# Patient Record
(demographics unavailable — no encounter records)

---

## 2017-07-05 NOTE — RAD
Indication cramping.



A first trimester obstetrical ultrasound examination was performed. No prior

imaging is available associated with this pregnancy.



The uterus measures approximately 9.7 x 5.9 x 5.8 cm. There is an intrauterine

gestational sac. There is a fetal pole. The crown-rump length of 2.6 cm is

compatible with a gestational age of approximately 9 weeks 3 days. By

sonographic analysis the expected date of confinement is 2/4/2018. A fetal

heart rate of 168 was documented. The maternal ovaries appeared unremarkable.



IMPRESSION: Single, viable, IUP of approximately 9 weeks 3 days gestation.

## 2017-07-05 NOTE — PHYS DOC
Past Medical History


Past Medical History:  No Pertinent History


Past Surgical History:  No Surgical History


Alcohol Use:  None


Drug Use:  Marijuana





Adult General


Chief Complaint


Chief Complaint:  ABDOMINAL PAIN





HPI


HPI





26-year-old female presenting to the emergency department today with a cramping 

sensation on both sides for flank. This been present for about 1-2 weeks. She 

reports not having a menstrual period and is concerned she may be pregnant. The 

pain is a mild intermittent discomfort. It is nonradiating. She denies polyuria 

or dysuria. She denies hematuria.





Review of systems is negative for chest pain shortness of breath fevers chills 

vomiting. She denies any changes in her vaginal fluid. All other review of 

systems is negative unless otherwise noted in history of present illness.





ED course: 26-year-old  female presenting to the emergency department To 

Be Pregnant on Urine Pregnancy Test. Ultrasound Obtained Which Showed a Single 

Intrauterine pregnancy. Labs showed mildly low potassium. Unfortunately, the 

patient had to leave prior to being able to be discharged. I informed her that 

she had low potassium and that if she would be able to wait I would write her 

prescription for potassium and a prenatal vitamin. She stated she needed to 

leave and left. She was able to make medical decisions.





Review of Systems


Review of Systems


SEE ABOVE.





Allergies


Allergies





Allergies








Coded Allergies Type Severity Reaction Last Updated Verified


 


  morphine Allergy Intermediate  14 Yes











Physical Exam


Physical Exam


SEE ABOVE


Constitutional: Well developed, well nourished, no acute distress, non-toxic 

appearance. []


HENT: Normocephalic, atraumatic, bilateral external ears normal, oropharynx 

moist, no oral exudates, nose normal. []


Eyes: PERRLA, EOMI, conjunctiva normal, no discharge. [] 


Neck: Normal range of motion, no tenderness, supple, no stridor. [] 


Cardiovascular:Heart rate regular rhythm, no murmur []


Lungs & Thorax:  Bilateral breath sounds clear to auscultation []


Abdomen: Abdomen is soft and nontender palpation. Gravid abdomen. Nontender 

McBurney's point. Negative Graay sign. 


Skin: Warm, dry, no erythema, no rash. [] 


Back: No tenderness, no CVA tenderness. [] 


Extremities: No tenderness, no cyanosis, no clubbing, ROM intact, no edema. [] 


Neurologic: Alert and oriented X 3, normal motor function, normal sensory 

function, no focal deficits noted. []


Psychologic: Affect normal, judgement normal, mood normal. []





Current Patient Data


Vital Signs





 Vital Signs








  Date Time  Temp Pulse Resp B/P (MAP) Pulse Ox O2 Delivery O2 Flow Rate FiO2


 


17 07:36  100 20 136/77 (96) 100   


 


17 06:40 98.5       





 98.5       








Lab Values





 Laboratory Tests








Test


  17


05:53 17


07:00 17


07:40


 


POC Urine HCG, Qualitative


  Hcg positive


(Negative) 


  


 


 


Urine Collection Type  Unknown   


 


Urine Color  Yellow   


 


Urine Clarity  Clear   


 


Urine pH  7.5   


 


Urine Specific Gravity  1.020   


 


Urine Protein


  


  Negative mg/dL


(NEG-TRACE) 


 


 


Urine Glucose (UA)


  


  Negative mg/dL


(NEG) 


 


 


Urine Ketones (Stick)


  


  Negative mg/dL


(NEG) 


 


 


Urine Blood


  


  Negative (NEG)


  


 


 


Urine Nitrite


  


  Negative (NEG)


  


 


 


Urine Bilirubin


  


  Negative (NEG)


  


 


 


Urine Urobilinogen Dipstick


  


  0.2 mg/dL (0.2


mg/dL) 


 


 


Urine Leukocyte Esterase


  


  Negative (NEG)


  


 


 


Urine RBC  0 /HPF (0-2)   


 


Urine WBC


  


  5-10 /HPF


(0-4) 


 


 


Urine Squamous Epithelial


Cells 


  Few /LPF  


  


 


 


Urine Bacteria


  


  0 /HPF (0-FEW)


  


 


 


Urine Mucus  Slight /LPF   


 


White Blood Count


  


  


  11.5 x10^3/uL


(4.0-11.0)  H


 


Red Blood Count


  


  


  4.22 x10^6/uL


(3.50-5.40)


 


Hemoglobin


  


  


  12.6 g/dL


(12.0-15.5)


 


Hematocrit


  


  


  38.8 %


(36.0-47.0)


 


Mean Corpuscular Volume


  


  


  92 fL ()


 


 


Mean Corpuscular Hemoglobin   30 pg (25-35)  


 


Mean Corpuscular Hemoglobin


Concent 


  


  32 g/dL


(31-37)


 


Red Cell Distribution Width


  


  


  12.8 %


(11.5-14.5)


 


Platelet Count


  


  


  319 x10^3/uL


(140-400)


 


Neutrophils (%) (Auto)   71 % (31-73)  


 


Lymphocytes (%) (Auto)   20 % (24-48)  L


 


Monocytes (%) (Auto)   8 % (0-9)  


 


Eosinophils (%) (Auto)   0 % (0-3)  


 


Basophils (%) (Auto)   1 % (0-3)  


 


Neutrophils # (Auto)


  


  


  8.2 x10^3uL


(1.8-7.7)  H


 


Lymphocytes # (Auto)


  


  


  2.3 x10^3/uL


(1.0-4.8)


 


Monocytes # (Auto)


  


  


  0.9 x10^3/uL


(0.0-1.1)


 


Eosinophils # (Auto)


  


  


  0.0 x10^3/uL


(0.0-0.7)


 


Basophils # (Auto)


  


  


  0.1 x10^3/uL


(0.0-0.2)


 


Maternal Serum HCG Beta


Subunit 


  


  000018 mIU/mL


(0-5)  H


 


Sodium Level


  


  


  139 mmol/L


(136-145)


 


Potassium Level


  


  


  3.3 mmol/L


(3.5-5.1)  L


 


Chloride Level


  


  


  104 mmol/L


()


 


Carbon Dioxide Level


  


  


  25 mmol/L


(21-32)


 


Anion Gap   10 (6-14)  


 


Blood Urea Nitrogen


  


  


  11 mg/dL


(7-20)


 


Creatinine


  


  


  0.6 mg/dL


(0.6-1.0)


 


Estimated GFR


(Cockcroft-Gault) 


  


  146.2  


 


 


Glucose Level


  


  


  94 mg/dL


(70-99)


 


Calcium Level


  


  


  8.5 mg/dL


(8.5-10.1)


 


Total Bilirubin


  


  


  0.3 mg/dL


(0.2-1.0)


 


Direct Bilirubin


  


  


  0.1 mg/dL


(0.0-0.2)


 


Aspartate Amino Transferase


(AST) 


  


  14 U/L (15-37)


L


 


Alanine Aminotransferase (ALT)


  


  


  18 U/L (14-59)


 


 


Alkaline Phosphatase


  


  


  47 U/L


()


 


Total Protein


  


  


  7.4 g/dL


(6.4-8.2)


 


Albumin


  


  


  3.8 g/dL


(3.4-5.0)


 


Lipase


  


  


  83 U/L


()





 Laboratory Tests


17 07:40








 Laboratory Tests


17 07:40














EKG


EKG


[]





Radiology/Procedures


Radiology/Procedures


[]





Course & Med Decision Making


Course & Med Decision Making


Pertinent Labs and Imaging studies reviewed. (See chart for details)





[]





Dragon Disclaimer


Dragon Disclaimer


This electronic medical record was generated, in whole or in part, using a 

voice recognition dictation system.





Departure


Departure


Impression:  


 Primary Impression:  


 Abdominal pain affecting pregnancy


Disposition:   HOME, SELF-CARE


Condition:  STABLE


Referrals:  


CASPER RIDDLE MD (PCP)








NERI JOINER Jr, MD


3 days with baby doctor


Patient Instructions:  ABCs of Pregnancy





Additional Instructions:  


Thank you for allowing us to participate in your care today.





Followup with baby doctor in 3 days.  Call your Primary Doctor tomorrow and 

inform them of your visit today.  If you do not have a primary care provider 

you can ask for a list of our primary care providers. Return to the emergency 

department you have any new or concerning findings.





This should be evaluated by the primary care physician and any necessary 

consulting services for continued management within a few days after discharge. 

Return to emergency room if you have any  new or concerning symptoms including 

but not limited to fever, chills, nausea, vomiting, intractable pain, any new 

rashes, chest pain, shortness of air, uncontrolled bleeding, difficulty 

breathing, and/or vision loss.


Scripts


Prenatal Vits W-Ca,Fe,Fa(<1MG) (PRENATAL VITAMINS) 1 Each Tablet


1 TAB PO DAILY, #15 TAB 0 Refills


   Prov: JOVITA MELGOZA MD         17











JOVITA MELGOZA MD 2017 07:50

## 2017-08-03 NOTE — PHYS DOC
Past Medical History


Past Medical History:  No Pertinent History


Past Surgical History:  Tonsillectomy


Alcohol Use:  None


Drug Use:  Marijuana





Adult General


Chief Complaint


Chief Complaint:  SKIN PROBLEM





HPI


HPI





Patient is a 26  year old female currently 14 weeks pregnant who presents with 

a pruritic rash that began this morning. Patient denies any known cause for 

this rash. She states she follows up with her OB. She has an appointment on 

August 16, 2017.





Review of Systems


Review of Systems





Constitutional: Denies fever or chills []


GI:pregnant


: Denies dysuria or hematuria []


Musculoskeletal: Denies back pain or joint pain []


Integument: rash 


Neurologic: Denies headache, focal weakness or sensory changes []





Allergies


Allergies





Allergies








Coded Allergies Type Severity Reaction Last Updated Verified


 


  morphine Allergy Intermediate  5/27/14 Yes











Physical Exam


Physical Exam





Constitutional: Well developed, well nourished, no acute distress, non-toxic 

appearance. []


Abdomen: Gravid abdomen. Bowel sounds normal, soft, no tenderness, no masses, 

no pulsatile masses. [] 


Skin: Patient has mild amount of erythema is papular rash on bilateral lower 

extremities and small amount of the same rash on bilateral upper extremities.


Back: No tenderness, no CVA tenderness. [] 


Extremities: No tenderness, no cyanosis, no clubbing, ROM intact, no edema. [] 


Neurologic: Alert and oriented X 3, normal motor function, normal sensory 

function, no focal deficits noted. []


Psychologic: Affect normal, judgement normal, mood normal. []





Current Patient Data


Vital Signs





 Vital Signs








  Date Time  Temp Pulse Resp B/P (MAP) Pulse Ox O2 Delivery O2 Flow Rate FiO2


 


8/3/17 11:53 98.4 104 20  97 Room Air  





 98.4       











EKG


EKG


[]





Radiology/Procedures


Radiology/Procedures


[]





Course & Med Decision Making


Course & Med Decision Making


Pertinent Labs and Imaging studies reviewed. (See chart for details)





Patient has contact dermatitis rash from unknown source. She is currently 14 

weeks pregnant. She'll be discharged with triamcinolone cream. Follow-up with 

her OB/GYN on August 16, 2017 as scheduled.





Dragon Disclaimer


Dragon Disclaimer


This electronic medical record was generated, in whole or in part, using a 

voice recognition dictation system.





Departure


Departure


Impression:  


 Primary Impression:  


 Contact dermatitis


Disposition:  01 HOME, SELF-CARE


Condition:  STABLE


Referrals:  


RENETTA CARLOS (PCP)


Follow-up with your OB/GYN on August 16, 2017


Patient Instructions:  Contact Dermatitis, Easy-to-Read





Additional Instructions:  


You were seen for contact dermatitis rash. Use the cream provided as ordered. 

Follow-up with your OB/GYN on August 16, 2017 or sooner if need be.


Scripts


Triamcinolone Acetonide (TRIAMCINOLONE ACETONIDE 0.1% OINT) 15 Gm Oint...g.


1 ANNETTE TP TID for WOUND CARE, #1 TUBE 1 Refill


   Prov: MIKE DEVI         8/3/17





Problem Qualifiers








 Primary Impression:  


 Contact dermatitis


 Contact dermatitis type:  unspecified  Contact dermatitis trigger:  

unspecified trigger  Qualified Codes:  L25.9 - Unspecified contact dermatitis, 

unspecified cause








MIKE DEVI APRN Aug 3, 2017 12:11

## 2019-11-14 NOTE — PHYS DOC
Past Medical History


Past Medical History:  No Pertinent History


Past Surgical History:  Tonsillectomy


Alcohol Use:  None


Drug Use:  Marijuana





Adult General


Chief Complaint


Chief Complaint:  COUGH





HPI


HPI





Patient is a 28  year old female who presents to the emergency department with 

complaints of a productive cough with green sputum for the last 3 days. Patient 

also reports some nasal congestion and chest congestion. She denies any ear 

pain, sore throat, chest pain, palpitations, abdominal pain, nausea, vomiting, 

diarrhea, dysuria, hematuria, increased urinary frequency. Patient states that 

she recently was diagnosed with influenza about 3 weeks ago. She states that 

those symptoms had resolved completely and she was feeling better up until 3 

days ago. Patient reports that she has been taking TheraFlu for her symptoms 

little relief. She currently complains of fatigue and body aches addition to her

cough. The patient rates her discomfort a 5 out of 10 on pain scale, there are 

no alleviating or exacerbating factors. All other ROS is neg unless otherwise 

noted in HPI.





Review of Systems


Review of Systems


See Above





Allergies


Allergies





Allergies








Coded Allergies Type Severity Reaction Last Updated Verified


 


  morphine Allergy Intermediate  5/27/14 Yes











Physical Exam


Physical Exam


See Above


Constitutional: Well developed, well nourished, no acute distress, non-toxic 

appearance. []


HENT: Normocephalic, atraumatic, bilateral external ears normal, bilateral TMs 

normal, oropharynx moist, no oral exudates, nose congested


Eyes: PERRLA, EOMI, conjunctiva normal, no discharge. [] 


Neck: Normal range of motion, no tenderness, supple, no stridor. [] 


Cardiovascular:Heart rate regular rhythm, no murmur []


Lungs & Thorax:  Bilateral breath sounds clear to auscultation []


Skin: Warm, dry, no erythema, no rash. [] 


Back: No tenderness


Extremities: No cyanosis, no clubbing, ROM intact, no edema. [] 


Neurologic: Alert and oriented X 3, no focal deficits noted. []


Psychologic: Affect normal, judgement normal, mood normal. []





EKG


EKG


[]





Radiology/Procedures


Radiology/Procedures


[]





Course & Med Decision Making


Course & Med Decision Making


Pertinent Labs and Imaging studies reviewed. (See chart for details)





[]





Dragon Disclaimer


Dragon Disclaimer


This electronic medical record was generated, in whole or in part, using a voice

 recognition dictation system.





Departure


Departure


Impression:  


   Primary Impression:  


   Upper respiratory infection, acute


Disposition:  01 HOME, SELF-CARE


Condition:  STABLE


Referrals:  


NO PCP (PCP)


Patient Instructions:  Upper Respiratory Infection, Adult, Easy-to-Read





Additional Instructions:  


Fill prescription(s) and use as directed. Recommend use of a Cool mist 

humidifier in room at bedtime. Alternate Tylenol or ibuprofen as needed for 

pain/fever. Increase clear fluids. Avoid airway triggers such as smoke, 

fragrance, dust, and pollen. May take over-the-counter cough suppressants as 

needed. Follow-up with your primary care doctor if symptoms persist, return to 

the ER if symptoms worsen.


Scripts


Benzonatate (TESSALON PERLE) 100 Mg Capsule


1 CAP PO TID PRN for COUGH for 7 Days, #21 CAP 0 Refills


   Prov: SERGIO MELENDEZ APRN         11/14/19 


Albuterol Sulfate (VENTOLIN HFA INHALER) 18 Gm Hfa.aer.ad


2 PUFF INH Q4HRS PRN for WHEEZING for 30 Days, #1 INHALER 0 Refills


   Prov: SERGIO MELENDEZ APRN         11/14/19











SERGIO MELENDEZ APRN       Nov 14, 2019 16:40

## 2020-10-13 NOTE — RAD
Transvaginal OB ultrasound less than 14 weeks 10/13/2020

 

CLINICAL HISTORY: First trimester pregnancy. Right pelvic pain.

 

TECHNIQUE: A transvaginal pelvic ultrasound study was performed. Multiple 

images were obtained.

 

FINDINGS: The uterus is within normal limits in size and echogenicity. It 

measures 8.5 x 5.2 x 4.5 cm in longitudinal, transverse, and AP 

dimensions. The endometrial echo complex measures 1.9 mm in thickness 

which is within normal limits. No gestational sac is seen within the 

endometrial canal. No focal abnormality of the uterus is seen.

 

Both ovaries are within normal limits in size. The right ovary measures 

3.5 x 2.4 x 2.3 cm in size. The left ovary measures 4.4 x 2.4 x 2.2 cm in 

size. A rounded anechoic structure with internal echoes is seen within the

left ovary which measures 2 cm in size. This could represent a hemorrhagic

follicle/corpus luteum. No adnexal mass is seen. No free fluid is noted.

 

IMPRESSION: Essentially negative study. No IUP is seen. These ultrasound 

findings could be seen with an very early IUP, missed spontaneous 

or possibly due to an occult ectopic pregnancy. Clinical correlation and 

correlation with the patient's serial beta hCG level is recommended.

 

Electronically signed by: Herman Melgoza MD (10/13/2020 5:09 PM) [id number]

## 2020-10-13 NOTE — PHYS DOC
Past Medical History


Past Medical History:  No Pertinent History, Asthma, Other


Additional Past Medical Histor:  ectopic


 (SERGIO MELENDEZ)


Past Surgical History:  Tonsillectomy


Additional Past Surgical Histo:  ectopic pregnancy


 (SERGIO MELENDEZ)


Smoking Status:  Current Every Day Smoker


Alcohol Use:  None


Drug Use:  Marijuana


 (SERGIO MELENDEZ)





General Adult


EDM:


Chief Complaint:  NAUSEA/VOMITING/DIARRHA





HPI:


HPI:





Patient is a 29  year old female who presents to the emergency department with 

concerns of nausea, vomiting, and diarrhea.  Patient reports 2 episodes of 

vomiting and one episode of diarrhea in the last 24 hours.  She states her last 

menstrual cycle was on  reports concerns that she might be 

pregnant.  Patient denies any irregular vaginal discharge, vaginal odor,, or 

vaginal bleeding.  She denies any fever, cough, shortness of breath, dysuria, 

increased urinary frequency, hematuria, back pain, body aches, shortness of 

breath, fatigue, headache, fever, or cough.  Patient reports that if she is 

pregnant this would be her fourth pregnancy, she has 2 living children at home 

and 1 previous right-sided ectopic pregnancy.  She currently rates her abdominal

pain a 4 out of 10 on the pain scale, she reports a dull aching sensation in her

suprapubic and right lower quadrants.  She denies any radiation of the pain to 

her back or alleviating factors.


 (SERGIO MELENDEZ)





Review of Systems:


Review of Systems:


Constitutional:   Denies fever or chills. []


Eyes:   Denies change in visual acuity. []


HENT:   Denies nasal congestion or sore throat. [] 


Respiratory:   Denies cough or shortness of breath. [] 


Cardiovascular:   Denies chest pain or edema. [] 


GI:   See HPI


: See HPI


Musculoskeletal:   Denies back pain or joint pain. [] 


Integument:   Denies rash. [] 


Neurologic:   Denies headache, focal weakness or sensory changes. [] 


Psychiatric:  Denies depression or anxiety. []





Complete ROS is negative unless otherwise stated in the HPI.


 (SERGIO MELENDEZ)





Heart Score:


Risk Factors:


Risk Factors:  DM, Current or recent (<one month) smoker, HTN, HLP, family 

history of CAD, obesity.


Risk Scores:


Score 0 - 3:  2.5% MACE over next 6 weeks - Discharge Home


Score 4 - 6:  20.3% MACE over next 6 weeks - Admit for Clinical Observation


Score 7 - 10:  72.7% MACE over next 6 weeks - Early Invasive Strategies


 (SERGIO MELENDEZ)





Current Medications:





Current Medications








 Medications


  (Trade)  Dose


 Ordered  Sig/[place]yon  Start Time


 Stop Time Status Last Admin


Dose Admin


 


 Ondansetron HCl


  (Zofran)  4 mg  1X  ONCE  10/13/20 14:45


 10/13/20 14:46 DC 10/13/20 14:58


4 MG


 


 Sodium Chloride  1,000 ml @ 


 1,000 mls/hr  1X  ONCE  10/13/20 14:45


 10/13/20 15:44 DC 10/13/20 14:57


1,000 MLS/HR








 (SERGIO MELENDEZ)





Allergies:


Allergies:





Allergies








Coded Allergies Type Severity Reaction Last Updated Verified


 


  morphine Allergy Intermediate  14 Yes








 (SERGIO MELENDEZ)





Physical Exam:


PE:





Constitutional: Well developed, well nourished, no acute distress, non-toxic 

appearance. []


HENT: Normocephalic, atraumatic, bilateral external ears normal, nose normal. []


Eyes: PERRLA, EOMI, conjunctiva normal, no discharge. [] 


Neck: Normal range of motion, no stridor. [] 


Cardiovascular:Heart rate regular rhythm


Lungs & Thorax:  Respirations even and unlabored, no retractions, no respiratory

 distress


Abdomen: soft, right lower quadrant tenderness to palpation, no rebound 

tenderness, no guarding, negative Rovsing's, negative obturator, negative 

McBurney's point; suprapubic tenderness to palpation


Back: No CVA tenderness


Skin: Warm, dry, no erythema, no rash. [] 


Extremities: No cyanosis, ROM intact, no edema. [] 


Neurologic: Alert and oriented X 3, no focal deficits noted. []


Psychologic: Affect normal, judgement normal, mood normal. []


 (SERGIO MELENDEZ)





Current Patient Data:


Labs:





                                Laboratory Tests








Test


 10/13/20


14:46 10/13/20


15:24 10/13/20


15:32


 


White Blood Count


 8.7 x10^3/uL


(4.0-11.0) 


 





 


Red Blood Count


 4.16 x10^6/uL


(3.50-5.40) 


 





 


Hemoglobin


 12.6 g/dL


(12.0-15.5) 


 





 


Hematocrit


 37.8 %


(36.0-47.0) 


 





 


Mean Corpuscular Volume


 91 fL ()


 


 





 


Mean Corpuscular Hemoglobin 30 pg (25-35)    


 


Mean Corpuscular Hemoglobin


Concent 33 g/dL


(31-37) 


 





 


Red Cell Distribution Width


 13.6 %


(11.5-14.5) 


 





 


Platelet Count


 363 x10^3/uL


(140-400) 


 





 


Neutrophils (%) (Auto) 65 % (31-73)    


 


Lymphocytes (%) (Auto) 24 % (24-48)    


 


Monocytes (%) (Auto) 9 % (0-9)    


 


Eosinophils (%) (Auto) 1 % (0-3)    


 


Basophils (%) (Auto) 1 % (0-3)    


 


Neutrophils # (Auto)


 5.7 x10^3/uL


(1.8-7.7) 


 





 


Lymphocytes # (Auto)


 2.1 x10^3/uL


(1.0-4.8) 


 





 


Monocytes # (Auto)


 0.8 x10^3/uL


(0.0-1.1) 


 





 


Eosinophils # (Auto)


 0.1 x10^3/uL


(0.0-0.7) 


 





 


Basophils # (Auto)


 0.1 x10^3/uL


(0.0-0.2) 


 





 


Maternal Serum HCG Beta


Subunit 30 mIU/mL


(0-5)  H 


 





 


Sodium Level


 141 mmol/L


(136-145) 


 





 


Potassium Level


 3.8 mmol/L


(3.5-5.1) 


 





 


Chloride Level


 106 mmol/L


() 


 





 


Carbon Dioxide Level


 29 mmol/L


(21-32) 


 





 


Anion Gap 6 (6-14)    


 


Blood Urea Nitrogen


 12 mg/dL


(7-20) 


 





 


Creatinine


 0.9 mg/dL


(0.6-1.0) 


 





 


Estimated GFR


(Cockcroft-Gault) 89.6  


 


 





 


BUN/Creatinine Ratio 13 (6-20)    


 


Glucose Level


 92 mg/dL


(70-99) 


 





 


Calcium Level


 9.2 mg/dL


(8.5-10.1) 


 





 


Magnesium Level


 2.0 mg/dL


(1.8-2.4) 


 





 


Total Bilirubin


 0.4 mg/dL


(0.2-1.0) 


 





 


Aspartate Amino Transferase


(AST) 12 U/L (15-37)


L 


 





 


Alanine Aminotransferase (ALT)


 17 U/L (14-59)


 


 





 


Alkaline Phosphatase


 65 U/L


() 


 





 


Total Protein


 7.3 g/dL


(6.4-8.2) 


 





 


Albumin


 3.5 g/dL


(3.4-5.0) 


 





 


Albumin/Globulin Ratio


 0.9 (1.0-1.7)


L 


 





 


Lipase


 127 U/L


() 


 





 


Urine Collection Type  Unknown   


 


Urine Color  Yellow   


 


Urine Clarity  Clear   


 


Urine pH


 


 7.5 (<5.0-8.0)


 





 


Urine Specific Gravity


 


 >=1.030


(1.000-1.030) 





 


Urine Protein


 


 Negative mg/dL


(NEG-TRACE) 





 


Urine Glucose (UA)


 


 Negative mg/dL


(NEG) 





 


Urine Ketones (Stick)


 


 Trace mg/dL


(NEG) 





 


Urine Blood


 


 Negative (NEG)


 





 


Urine Nitrite


 


 Negative (NEG)


 





 


Urine Bilirubin


 


 Negative (NEG)


 





 


Urine Urobilinogen Dipstick


 


 1.0 mg/dL (0.2


mg/dL) 





 


Urine Leukocyte Esterase  Small (NEG)   


 


Urine RBC  0 /HPF (0-2)   


 


Urine WBC


 


 1-4 /HPF (0-4)


 





 


Urine Squamous Epithelial


Cells 


 Many /LPF  


 





 


Urine Bacteria


 


 Moderate /HPF


(0-FEW) 





 


Urine Mucus  Marked /LPF   


 


POC Urine HCG, Qualitative


 


 


 Hcg positive


(Negative)





                                Laboratory Tests


10/13/20 14:46








                                Laboratory Tests


10/13/20 14:46








Vital Signs:





                                   Vital Signs








  Date Time  Temp Pulse Resp B/P (MAP) Pulse Ox O2 Delivery O2 Flow Rate FiO2


 


10/13/20 17:27  75 16 109/70 (83)  Room Air  


 


10/13/20 16:27     99   


 


10/13/20 14:30 98.4       





 98.4       








 (SERGIO MELENDEZ)





EKG:


EKG:


[]


 (SERGIO MELENDEZ)





Radiology/Procedures:


Radiology/Procedures:


PROCEDURE: OB TRANSVAG





Transvaginal OB ultrasound less than 14 weeks 10/13/2020


 


CLINICAL HISTORY: First trimester pregnancy. Right pelvic pain.


 


TECHNIQUE: A transvaginal pelvic ultrasound study was performed. Multiple 


images were obtained.


 


FINDINGS: The uterus is within normal limits in size and echogenicity. It 


measures 8.5 x 5.2 x 4.5 cm in longitudinal, transverse, and AP 


dimensions. The endometrial echo complex measures 1.9 mm in thickness 


which is within normal limits. No gestational sac is seen within the 


endometrial canal. No focal abnormality of the uterus is seen.


 


Both ovaries are within normal limits in size. The right ovary measures 


3.5 x 2.4 x 2.3 cm in size. The left ovary measures 4.4 x 2.4 x 2.2 cm in 


size. A rounded anechoic structure with internal echoes is seen within the


left ovary which measures 2 cm in size. This could represent a hemorrhagic


follicle/corpus luteum. No adnexal mass is seen. No free fluid is noted.


 


IMPRESSION: Essentially negative study. No IUP is seen. These ultrasound 


findings could be seen with an very early IUP, missed spontaneous 


or possibly due to an occult ectopic pregnancy. Clinical correlation and 


correlation with the patient's serial beta hCG level is recommended.


 


Electronically signed by: Herman Melgoza MD (10/13/2020 5:09 PM) [id number]








[]


 (SERGIO MELENDEZ)





Course & Med Decision Making:


Course & Med Decision Making


Pertinent Labs and Imaging studies reviewed. (See chart for details)


29-year-old female presents emergency department with complaints of lower 

abdominal pain, nausea, vomiting, diarrhea.


Informed patient of the positive pregnancy test.  Patient's beta-hCG level is 

30, there was no evidence of IUP on the ultrasound, I advised the patient that 

this is likely due to early pregnancy diagnosis.  I encouraged patient to try 

taking a daily prenatal vitamin.  I advised the patient she needs to follow-up 

with her OB/GYN or Dr. Tobar for repeat hCG in 2 days.  Return to the ER if 

symptoms worsen.





Patient verbalized an understanding of home care, medications, follow-up, and 

return to ED instructions and was in agreement with the plan of care.








[]


 (SERGIO MELENDEZ)


Course & Med Decision Making


I have reviewed the PA/NP's note and Plan of Care.  I was available for consult

ation as needed during the patient's visit in the emergency department.  I agree

 with the clinical impression, plans and disposition.


 (NAVEED STONE MD)


Dragon Disclaimer:


Dragon Disclaimer:


This electronic medical record was generated, in whole or in part, using a voice

 recognition dictation system.


 (SERGIO MELENDEZ)





Departure


Departure


Impression:  


   Primary Impression:  


   Abdominal pain during pregnancy in first trimester


Disposition:  01 DC HOME SELF CARE/HOMELESS


Condition:  STABLE


Referrals:  


YANETH WATTS MD


Patient Instructions:  Abdominal Pain During Pregnancy, Easy-to-Read





Additional Instructions:  


Follow-up with your OB/GYN in 2 days to have your hCG level redrawn.  Today your

 hCG level was 30.  Recommend pelvic rest until you have been evaluated by an 

OB/GYN.  Return to the ER if symptoms worsen.











SERGIO MELENDEZ       Oct 13, 2020 18:10


NAVEED STONE MD              Oct 14, 2020 19:47